# Patient Record
Sex: MALE | Race: OTHER | Employment: FULL TIME | URBAN - METROPOLITAN AREA
[De-identification: names, ages, dates, MRNs, and addresses within clinical notes are randomized per-mention and may not be internally consistent; named-entity substitution may affect disease eponyms.]

---

## 2022-08-11 ENCOUNTER — APPOINTMENT (OUTPATIENT)
Dept: GENERAL RADIOLOGY | Age: 32
End: 2022-08-11

## 2022-08-11 ENCOUNTER — APPOINTMENT (OUTPATIENT)
Dept: CT IMAGING | Age: 32
End: 2022-08-11

## 2022-08-11 ENCOUNTER — HOSPITAL ENCOUNTER (EMERGENCY)
Age: 32
Discharge: HOME | End: 2022-08-11
Attending: EMERGENCY MEDICINE

## 2022-08-11 VITALS
TEMPERATURE: 98.9 F | OXYGEN SATURATION: 99 % | WEIGHT: 211.86 LBS | SYSTOLIC BLOOD PRESSURE: 124 MMHG | HEART RATE: 75 BPM | RESPIRATION RATE: 16 BRPM | DIASTOLIC BLOOD PRESSURE: 78 MMHG

## 2022-08-11 DIAGNOSIS — R07.89 CHEST WALL PAIN: Primary | ICD-10-CM

## 2022-08-11 LAB
ALBUMIN SERPL-MCNC: 4.2 G/DL (ref 3.4–5)
ALP BLD-CCNC: 63 U/L (ref 40–129)
ALT SERPL-CCNC: 23 U/L (ref 10–40)
ANION GAP SERPL CALCULATED.3IONS-SCNC: 10 MMOL/L (ref 3–16)
AST SERPL-CCNC: 14 U/L (ref 15–37)
BASOPHILS ABSOLUTE: 0.1 K/UL (ref 0–0.2)
BASOPHILS RELATIVE PERCENT: 1.1 %
BILIRUB SERPL-MCNC: 1 MG/DL (ref 0–1)
BILIRUBIN DIRECT: <0.2 MG/DL (ref 0–0.3)
BILIRUBIN, INDIRECT: ABNORMAL MG/DL (ref 0–1)
BUN BLDV-MCNC: 16 MG/DL (ref 7–20)
CALCIUM SERPL-MCNC: 8.4 MG/DL (ref 8.3–10.6)
CHLORIDE BLD-SCNC: 103 MMOL/L (ref 99–110)
CO2: 25 MMOL/L (ref 21–32)
CREAT SERPL-MCNC: 0.8 MG/DL (ref 0.9–1.3)
EKG ATRIAL RATE: 68 BPM
EKG DIAGNOSIS: NORMAL
EKG P AXIS: 36 DEGREES
EKG P-R INTERVAL: 150 MS
EKG Q-T INTERVAL: 360 MS
EKG QRS DURATION: 84 MS
EKG QTC CALCULATION (BAZETT): 382 MS
EKG R AXIS: -8 DEGREES
EKG T AXIS: 40 DEGREES
EKG VENTRICULAR RATE: 68 BPM
EOSINOPHILS ABSOLUTE: 0.1 K/UL (ref 0–0.6)
EOSINOPHILS RELATIVE PERCENT: 2.2 %
GFR AFRICAN AMERICAN: >60
GFR NON-AFRICAN AMERICAN: >60
GLUCOSE BLD-MCNC: 111 MG/DL (ref 70–99)
HCT VFR BLD CALC: 40.7 % (ref 40.5–52.5)
HEMOGLOBIN: 14.4 G/DL (ref 13.5–17.5)
LIPASE: 30 U/L (ref 13–60)
LYMPHOCYTES ABSOLUTE: 1.5 K/UL (ref 1–5.1)
LYMPHOCYTES RELATIVE PERCENT: 29.6 %
MAGNESIUM: 2 MG/DL (ref 1.8–2.4)
MCH RBC QN AUTO: 30.9 PG (ref 26–34)
MCHC RBC AUTO-ENTMCNC: 35.5 G/DL (ref 31–36)
MCV RBC AUTO: 87 FL (ref 80–100)
MONOCYTES ABSOLUTE: 0.4 K/UL (ref 0–1.3)
MONOCYTES RELATIVE PERCENT: 8.2 %
NEUTROPHILS ABSOLUTE: 2.9 K/UL (ref 1.7–7.7)
NEUTROPHILS RELATIVE PERCENT: 58.9 %
PDW BLD-RTO: 13.2 % (ref 12.4–15.4)
PLATELET # BLD: 165 K/UL (ref 135–450)
PMV BLD AUTO: 8.7 FL (ref 5–10.5)
POTASSIUM REFLEX MAGNESIUM: 3.4 MMOL/L (ref 3.5–5.1)
RBC # BLD: 4.67 M/UL (ref 4.2–5.9)
SODIUM BLD-SCNC: 138 MMOL/L (ref 136–145)
TOTAL PROTEIN: 7.4 G/DL (ref 6.4–8.2)
TROPONIN: <0.01 NG/ML
WBC # BLD: 4.9 K/UL (ref 4–11)

## 2022-08-11 PROCEDURE — 85025 COMPLETE CBC W/AUTO DIFF WBC: CPT

## 2022-08-11 PROCEDURE — 80048 BASIC METABOLIC PNL TOTAL CA: CPT

## 2022-08-11 PROCEDURE — 84484 ASSAY OF TROPONIN QUANT: CPT

## 2022-08-11 PROCEDURE — 70450 CT HEAD/BRAIN W/O DYE: CPT

## 2022-08-11 PROCEDURE — 93005 ELECTROCARDIOGRAM TRACING: CPT | Performed by: EMERGENCY MEDICINE

## 2022-08-11 PROCEDURE — 93010 ELECTROCARDIOGRAM REPORT: CPT | Performed by: INTERNAL MEDICINE

## 2022-08-11 PROCEDURE — 83735 ASSAY OF MAGNESIUM: CPT

## 2022-08-11 PROCEDURE — 71046 X-RAY EXAM CHEST 2 VIEWS: CPT

## 2022-08-11 PROCEDURE — 99285 EMERGENCY DEPT VISIT HI MDM: CPT

## 2022-08-11 PROCEDURE — 80076 HEPATIC FUNCTION PANEL: CPT

## 2022-08-11 PROCEDURE — 83690 ASSAY OF LIPASE: CPT

## 2022-08-11 ASSESSMENT — PAIN DESCRIPTION - ONSET: ONSET: SUDDEN

## 2022-08-11 ASSESSMENT — ENCOUNTER SYMPTOMS
ABDOMINAL PAIN: 0
ABDOMINAL DISTENTION: 0
CHEST TIGHTNESS: 0
CONSTIPATION: 0
EYE ITCHING: 0
EYE DISCHARGE: 0
BLOOD IN STOOL: 0
DIARRHEA: 0
COLOR CHANGE: 0
STRIDOR: 0
WHEEZING: 0
RECTAL PAIN: 0
BACK PAIN: 0
EYE REDNESS: 0
PHOTOPHOBIA: 0
EYE PAIN: 0
APNEA: 0
CHOKING: 0
VOMITING: 0
SHORTNESS OF BREATH: 0
COUGH: 0
NAUSEA: 0
ANAL BLEEDING: 0

## 2022-08-11 ASSESSMENT — PAIN - FUNCTIONAL ASSESSMENT: PAIN_FUNCTIONAL_ASSESSMENT: 0-10

## 2022-08-11 ASSESSMENT — PAIN DESCRIPTION - PAIN TYPE: TYPE: ACUTE PAIN

## 2022-08-11 ASSESSMENT — PAIN SCALES - GENERAL: PAINLEVEL_OUTOF10: 4

## 2022-08-11 ASSESSMENT — PAIN DESCRIPTION - FREQUENCY: FREQUENCY: INTERMITTENT

## 2022-08-11 NOTE — ED PROVIDER NOTES
[address] [facility]      Pt Name: Chandrika Bonilla  MRN: [id number]  [place] 1990  Date of evaluation: 8/11/2022  Provider: Elis Matthews MD    CHIEF COMPLAINT       Chief Complaint   Patient presents with    Headache            HISTORY OF PRESENT ILLNESS    Chandrika Bonilla is a 27 y.o. male who presents to the emergency department with chest pain. Patient endorses chest pain since a week ago. Has been constant. It is worse with movement. Better with rest.  Thinks he might of pulled a muscle. Also endorses having a headache 5 months ago and requesting a head CT. Denies headache currently. No shortness of breath. Chest pain is worse with movement. Better with rest.  Currently 3 out of 10 achy in nature. No other associated symptoms. Nursing Notes were reviewed. Including nursing noted for FM, Surgical History, Past Medical History, Social History, vitals, and allergies; agree with all. REVIEW OF SYSTEMS       Review of Systems   Constitutional:  Negative for activity change, appetite change, chills, diaphoresis, fatigue, fever and unexpected weight change. HENT:  Negative for congestion, dental problem, drooling, ear discharge and ear pain. Eyes:  Negative for photophobia, pain, discharge, redness, itching and visual disturbance. Respiratory:  Negative for apnea, cough, choking, chest tightness, shortness of breath, wheezing and stridor. Cardiovascular:  Positive for chest pain. Negative for palpitations and leg swelling. Gastrointestinal:  Negative for abdominal distention, abdominal pain, anal bleeding, blood in stool, constipation, diarrhea, nausea, rectal pain and vomiting. Endocrine: Negative for cold intolerance and heat intolerance. Genitourinary:  Negative for decreased urine volume and urgency. Musculoskeletal:  Negative for arthralgias and back pain. Skin:  Negative for color change and pallor.    Neurological: Negative for tremors, facial asymmetry and weakness. Hematological:  Negative for adenopathy. Does not bruise/bleed easily. Psychiatric/Behavioral:  Negative for agitation, behavioral problems, confusion and decreased concentration. Except as noted above the remainder of the review of systems was reviewed and negative. PAST MEDICAL HISTORY   No past medical history on file. SURGICAL HISTORY     No past surgical history on file. CURRENT MEDICATIONS     There are no discharge medications for this patient. ALLERGIES     Patient has no known allergies. FAMILY HISTORY      No family history on file. SOCIAL HISTORY       Social History     Socioeconomic History    Marital status:        PHYSICAL EXAM       ED Triage Vitals [08/11/22 0217]   BP Temp Temp Source Heart Rate Resp SpO2 Height Weight   (!) 153/96 98.9 °F (37.2 °C) Oral 65 18 99 % -- 211 lb 13.8 oz (96.1 kg)       Physical Exam  Vitals and nursing note reviewed. Constitutional:       General: He is not in acute distress. Appearance: He is well-developed. He is not diaphoretic. HENT:      Head: Normocephalic and atraumatic. Eyes:      General:         Right eye: No discharge. Left eye: No discharge. Pupils: Pupils are equal, round, and reactive to light. Neck:      Thyroid: No thyromegaly. Trachea: No tracheal deviation. Cardiovascular:      Rate and Rhythm: Normal rate and regular rhythm. Heart sounds: No murmur heard. Pulmonary:      Breath sounds: No wheezing or rales. Chest:      Chest wall: Tenderness present. Abdominal:      General: There is no distension. Palpations: Abdomen is soft. There is no mass. Tenderness: There is no abdominal tenderness. There is no guarding or rebound. Musculoskeletal:         General: No tenderness or deformity. Normal range of motion. Cervical back: Normal range of motion. Skin:     General: Skin is warm.       Coloration: Skin is not pale. Findings: No erythema or rash. Neurological:      Mental Status: He is alert. Cranial Nerves: No cranial nerve deficit. Motor: No abnormal muscle tone. Coordination: Coordination normal.       DIAGNOSTIC RESULTS     EKG: All EKG's are interpreted by the Emergency Department Physician who either signs or Co-signs this chart in the absence of acardiologist.    EKG normal sinus rhythm nonspecific EKG changes no ectopy    RADIOLOGY:   Non-plain film images such as CT, Ultrasoundand MRI are read by the radiologist. Plain radiographic images are visualized and preliminarily interpreted by the emergency physician with the below findings:    Imaging reassuring    ED BEDSIDE ULTRASOUND:   Performed by ED Physician - none    LABS:  Labs Reviewed   BASIC METABOLIC PANEL W/ REFLEX TO MG FOR LOW K - Abnormal; Notable for the following components:       Result Value    Potassium reflex Magnesium 3.4 (*)     Glucose 111 (*)     Creatinine 0.8 (*)     All other components within normal limits   HEPATIC FUNCTION PANEL - Abnormal; Notable for the following components:    AST 14 (*)     All other components within normal limits   CBC WITH AUTO DIFFERENTIAL   TROPONIN   LIPASE   MAGNESIUM       All other labs were withinnormal range or not returned as of this dictation. EMERGENCY DEPARTMENT COURSE and DIFFERENTIAL DIAGNOSIS/MDM:     PMH, Surgical Hx, FH, Social Hx reviewed by myself (ETOH usage, Tobacco usage, Drug usage reviewed by myself, no pertinent Hx)- No Pertinent Hx     Old records were reviewed by me     69-year-old with chest pain for a week. Reassuring findings. Labs and imaging reassuring. Appears to be musculoskeletal in origin. Discussed outpatient follow-up. I estimate there is LOW risk for Sepsis, MI, Stroke, Tamponade, PTX, Toxicity or other life threatening etiology thus I consider the discharge disposition reasonable.      The patient is at low risk for mortality based on demographic, history and clinical factors. Given the best available information and clinical assessment, I estimate the risk of hospitalization to be greater than risk of treatment at home. I have explained to the patient that the risk could rapidly change, given precautions for return and instructions. Explained to patient that the risk for mortality is low based on demographic, history and clinical factors. I discussed with patient the results of evaluation in the ED, diagnosis, care, and prognosis. The plan is to discharge to home. Patient is in agreement with plan and questions have been answered. I also discussed with patient the reasons which may require a return visit and the importance of follow-up care. The patient is well-appearing, nontoxic, and improved at the time of discharge. Patient agrees to call to arrange follow-up care as directed. Patient understands to return immediately for worsening/change in symptoms. CRITICAL CARE TIME   Total Critical Caretime was 0 minutes, excluding separately reportable procedures. There was a high probability of clinically significant/life threatening deterioration in the patient's condition which required my urgent intervention. PROCEDURES:  Unlessotherwise noted below, none    FINAL IMPRESSION      1. Chest wall pain          DISPOSITION/PLAN   DISPOSITION Decision To Discharge 08/11/2022 04:45:51 AM    PATIENT REFERRED TO:  PCP          [facility] [address]  Call in 1 day        DISCHARGE MEDICATIONS:  There are no discharge medications for this patient.          (Please note that portions ofthis note were completed with a voice recognition program.  Efforts were made to edit the dictations but occasionally words are mis-transcribed.)    Francisco J Licona MD(electronically signed)  Attending Emergency Physician       Francisco J Licona MD  08/11/22 2659